# Patient Record
Sex: MALE | Race: WHITE | NOT HISPANIC OR LATINO | Employment: OTHER | URBAN - METROPOLITAN AREA
[De-identification: names, ages, dates, MRNs, and addresses within clinical notes are randomized per-mention and may not be internally consistent; named-entity substitution may affect disease eponyms.]

---

## 2020-09-14 ENCOUNTER — OFFICE VISIT (OUTPATIENT)
Dept: ORTHOPEDICS | Facility: CLINIC | Age: 75
End: 2020-09-14
Payer: MEDICARE

## 2020-09-14 VITALS
SYSTOLIC BLOOD PRESSURE: 160 MMHG | DIASTOLIC BLOOD PRESSURE: 78 MMHG | BODY MASS INDEX: 27.29 KG/M2 | HEIGHT: 72 IN | WEIGHT: 201.5 LBS | HEART RATE: 75 BPM

## 2020-09-14 DIAGNOSIS — M65.30 TRIGGER FINGER OF LEFT HAND, UNSPECIFIED FINGER: Primary | ICD-10-CM

## 2020-09-14 DIAGNOSIS — M65.30 TRIGGER FINGER OF RIGHT HAND, UNSPECIFIED FINGER: ICD-10-CM

## 2020-09-14 PROCEDURE — 99999 PR PBB SHADOW E&M-EST. PATIENT-LVL III: ICD-10-PCS | Mod: PBBFAC,,, | Performed by: ORTHOPAEDIC SURGERY

## 2020-09-14 PROCEDURE — 3008F BODY MASS INDEX DOCD: CPT | Mod: CPTII,S$GLB,, | Performed by: ORTHOPAEDIC SURGERY

## 2020-09-14 PROCEDURE — 20550 NJX 1 TENDON SHEATH/LIGAMENT: CPT | Mod: F2,F7,F8,S$GLB | Performed by: ORTHOPAEDIC SURGERY

## 2020-09-14 PROCEDURE — 99999 PR PBB SHADOW E&M-EST. PATIENT-LVL III: CPT | Mod: PBBFAC,,, | Performed by: ORTHOPAEDIC SURGERY

## 2020-09-14 PROCEDURE — 1159F MED LIST DOCD IN RCRD: CPT | Mod: S$GLB,,, | Performed by: ORTHOPAEDIC SURGERY

## 2020-09-14 PROCEDURE — 1101F PR PT FALLS ASSESS DOC 0-1 FALLS W/OUT INJ PAST YR: ICD-10-PCS | Mod: CPTII,S$GLB,, | Performed by: ORTHOPAEDIC SURGERY

## 2020-09-14 PROCEDURE — 1159F PR MEDICATION LIST DOCUMENTED IN MEDICAL RECORD: ICD-10-PCS | Mod: S$GLB,,, | Performed by: ORTHOPAEDIC SURGERY

## 2020-09-14 PROCEDURE — 99203 OFFICE O/P NEW LOW 30 MIN: CPT | Mod: 25,S$GLB,, | Performed by: ORTHOPAEDIC SURGERY

## 2020-09-14 PROCEDURE — 99203 PR OFFICE/OUTPT VISIT, NEW, LEVL III, 30-44 MIN: ICD-10-PCS | Mod: 25,S$GLB,, | Performed by: ORTHOPAEDIC SURGERY

## 2020-09-14 PROCEDURE — 3008F PR BODY MASS INDEX (BMI) DOCUMENTED: ICD-10-PCS | Mod: CPTII,S$GLB,, | Performed by: ORTHOPAEDIC SURGERY

## 2020-09-14 PROCEDURE — 1126F PR PAIN SEVERITY QUANTIFIED, NO PAIN PRESENT: ICD-10-PCS | Mod: S$GLB,,, | Performed by: ORTHOPAEDIC SURGERY

## 2020-09-14 PROCEDURE — 20550 TENDON SHEATH: ICD-10-PCS | Mod: F2,F7,F8,S$GLB | Performed by: ORTHOPAEDIC SURGERY

## 2020-09-14 PROCEDURE — 1126F AMNT PAIN NOTED NONE PRSNT: CPT | Mod: S$GLB,,, | Performed by: ORTHOPAEDIC SURGERY

## 2020-09-14 PROCEDURE — 1101F PT FALLS ASSESS-DOCD LE1/YR: CPT | Mod: CPTII,S$GLB,, | Performed by: ORTHOPAEDIC SURGERY

## 2020-09-14 RX ORDER — TRIAMCINOLONE ACETONIDE 40 MG/ML
40 INJECTION, SUSPENSION INTRA-ARTICULAR; INTRAMUSCULAR
Status: DISCONTINUED | OUTPATIENT
Start: 2020-09-14 | End: 2020-09-14 | Stop reason: HOSPADM

## 2020-09-14 RX ADMIN — TRIAMCINOLONE ACETONIDE 40 MG: 40 INJECTION, SUSPENSION INTRA-ARTICULAR; INTRAMUSCULAR at 01:09

## 2020-09-14 NOTE — PROGRESS NOTES
2020    Chief Complaint:  Chief Complaint   Patient presents with    Hand Problem     NP, c/o bilateral ring and middle finger trigger       HPI:  John Hernández is a 75 y.o. male, who presents to clinic today has a history of bilateral middle and ring finger triggering.  He states that this has been going on for months.  He states that the right is worse than the left.  He states that he did not have any injury prior to the onset.  He states that there gradually getting worse.  He states that he has not had any treatment or evaluation.  He has no other complaints period    PMHX:  Past Medical History:   Diagnosis Date    Borderline diabetic     Elevated prostate specific antigen (PSA)     Hypotension        PSHX:  Past Surgical History:   Procedure Laterality Date    OTHER SURGICAL HISTORY  2017    tooth extraction       FMHX:  Family History   Problem Relation Age of Onset    Glaucoma Father     Cancer Mother         ovarian       SOCHX:  Social History     Tobacco Use    Smoking status: Former Smoker     Quit date: 1984     Years since quittin.7    Smokeless tobacco: Never Used   Substance Use Topics    Alcohol use: Yes     Comment: beer-occasionally       ALLERGIES:  [place] and Unable to assess    CURRENT MEDICATIONS:  No current outpatient medications on file prior to visit.     No current facility-administered medications on file prior to visit.        REVIEW OF SYSTEMS:  Review of Systems   Constitutional: Negative.    HENT: Positive for tinnitus. Negative for congestion, ear discharge, ear pain, hearing loss, nosebleeds, sinus pain and sore throat.    Eyes: Negative.    Respiratory: Negative.  Negative for stridor.    Cardiovascular: Negative.    Gastrointestinal: Negative.    Genitourinary: Negative.    Musculoskeletal: Negative.    Skin: Negative.    Neurological: Negative.    Endo/Heme/Allergies: Negative for environmental allergies and polydipsia. Bruises/bleeds easily.    Psychiatric/Behavioral: Negative.        GENERAL PHYSICAL EXAM:   Ht 6' (1.829 m)   Wt 91.4 kg (201 lb 8 oz)   BMI 27.33 kg/m²    GEN: well developed, well nourished, no acute distress   HENT: Normocephalic, atraumatic   EYES: No discharge, conjunctiva normal   NECK: Supple, non-tender   PULM: No wheezing, no respiratory distress   CV: RRR   ABD: Soft, non-tender    ORTHO EXAM:   Examination of bilateral hands and wrist reveals that there is no edema.  There are no skin changes.  Palpation produces mild tenderness over the A1 pulley of bilateral middle and ring fingers.  There are no other areas of tenderness.  Flexion and extension of the fingers reveals that there is active triggering of bilateral middle fingers and mild triggering of the right ring finger.  The left ring finger has no significant triggering noted.  He does report grossly intact sensation in the median radial and ulnar distributions bilaterally.  Capillary refill is less than 2 sec in all the digits    RADIOLOGY:   None    ASSESSMENT:   Right middle finger trigger, right ring finger trigger, left middle finger trigger    PLAN:  1.  After informed consent was obtained and under sterile conditions injection was placed to the right middle finger flexor tendon sheath, the right ring finger flexor tendon sheath, and the left middle finger flexor tendon sheath.  The patient tolerated all 3 injections well    2.  Patient will follow up with me on a p.r.n. basis

## 2020-09-14 NOTE — PROCEDURES
Tendon Sheath    Date/Time: 9/14/2020 1:20 PM  Performed by: Stiven Zamora MD  Authorized by: Stiven Zamora MD     Consent Done?:  Yes (Verbal)  Indications:  Pain  Site marked: the procedure site was marked    Timeout: prior to procedure the correct patient, procedure, and site was verified    Prep: patient was prepped and draped in usual sterile fashion      Local anesthetic:  Lidocaine 1% without epinephrine  Anesthetic total (ml):  0.5    Location:  Ring finger  Ring finger joint: Right ring finger flexor tendon sheath.  Needle size:  25 G  Medications:  40 mg triamcinolone acetonide 40 mg/mL (20mg injected)  Patient tolerance:  Patient tolerated the procedure well with no immediate complications

## 2020-09-14 NOTE — PROCEDURES
Tendon Sheath    Date/Time: 9/14/2020 1:20 PM  Performed by: Stiven Zamora MD  Authorized by: Stiven Zamora MD     Consent Done?:  Yes (Verbal)  Indications:  Pain  Site marked: the procedure site was marked    Timeout: prior to procedure the correct patient, procedure, and site was verified    Prep: patient was prepped and draped in usual sterile fashion      Local anesthetic:  Lidocaine 1% without epinephrine  Anesthetic total (ml):  0.5    Location:  Long finger  Long finger joint: Left middle finger flexor tendon sheath.  Needle size:  25 G  Medications:  40 mg triamcinolone acetonide 40 mg/mL (20mg injected)  Patient tolerance:  Patient tolerated the procedure well with no immediate complications

## 2020-09-14 NOTE — PROCEDURES
Tendon Sheath    Date/Time: 9/14/2020 1:20 PM  Performed by: Stiven Zamora MD  Authorized by: Stiven Zamora MD     Consent Done?:  Yes (Verbal)  Indications:  Pain  Site marked: the procedure site was marked    Timeout: prior to procedure the correct patient, procedure, and site was verified    Prep: patient was prepped and draped in usual sterile fashion      Local anesthetic:  Lidocaine 1% without epinephrine  Anesthetic total (ml):  0.5    Location:  Long finger  Long finger joint: Right middle finger flexor tendon sheath.  Needle size:  25 G  Medications:  40 mg triamcinolone acetonide 40 mg/mL (20mg injected)  Patient tolerance:  Patient tolerated the procedure well with no immediate complications

## 2021-01-09 ENCOUNTER — IMMUNIZATION (OUTPATIENT)
Dept: FAMILY MEDICINE | Facility: CLINIC | Age: 76
End: 2021-01-09
Payer: MEDICARE

## 2021-01-09 DIAGNOSIS — Z23 NEED FOR VACCINATION: ICD-10-CM

## 2021-01-09 PROCEDURE — 91300 COVID-19, MRNA, LNP-S, PF, 30 MCG/0.3 ML DOSE VACCINE: CPT | Mod: PBBFAC | Performed by: INTERNAL MEDICINE

## 2021-01-30 ENCOUNTER — IMMUNIZATION (OUTPATIENT)
Dept: FAMILY MEDICINE | Facility: CLINIC | Age: 76
End: 2021-01-30
Payer: MEDICARE

## 2021-01-30 DIAGNOSIS — Z23 NEED FOR VACCINATION: Primary | ICD-10-CM

## 2021-01-30 PROCEDURE — 0002A COVID-19, MRNA, LNP-S, PF, 30 MCG/0.3 ML DOSE VACCINE: CPT | Mod: PBBFAC | Performed by: INTERNAL MEDICINE

## 2021-01-30 PROCEDURE — 91300 COVID-19, MRNA, LNP-S, PF, 30 MCG/0.3 ML DOSE VACCINE: CPT | Mod: PBBFAC | Performed by: INTERNAL MEDICINE

## 2023-03-31 PROBLEM — N17.9 AKI (ACUTE KIDNEY INJURY): Status: ACTIVE | Noted: 2023-01-01

## 2023-03-31 PROBLEM — Z71.89 ACP (ADVANCE CARE PLANNING): Status: ACTIVE | Noted: 2023-01-01

## 2023-03-31 PROBLEM — R11.2 INTRACTABLE NAUSEA AND VOMITING: Status: ACTIVE | Noted: 2023-01-01

## 2023-03-31 PROBLEM — R19.7 DIARRHEA: Status: ACTIVE | Noted: 2023-01-01

## 2023-03-31 PROBLEM — N39.0 UTI (URINARY TRACT INFECTION): Status: ACTIVE | Noted: 2023-01-01

## 2023-04-02 PROBLEM — D61.818 PANCYTOPENIA: Status: ACTIVE | Noted: 2023-01-01

## 2023-04-03 PROBLEM — R63.4 WEIGHT LOSS: Status: ACTIVE | Noted: 2023-01-01

## 2023-04-05 PROBLEM — T78.3XXA ANGIOEDEMA: Status: ACTIVE | Noted: 2023-01-01

## 2023-04-05 PROBLEM — R50.9 FEVER: Status: ACTIVE | Noted: 2023-01-01

## 2023-04-05 PROBLEM — R22.1 NECK SWELLING: Status: ACTIVE | Noted: 2023-01-01

## 2023-04-06 PROBLEM — K25.9 GASTRIC ULCER: Status: ACTIVE | Noted: 2023-01-01

## 2023-04-12 PROBLEM — R19.7 DIARRHEA: Status: RESOLVED | Noted: 2023-01-01 | Resolved: 2023-01-01

## 2023-04-12 PROBLEM — K25.9 GASTRIC ULCER: Status: RESOLVED | Noted: 2023-01-01 | Resolved: 2023-01-01

## 2023-04-12 PROBLEM — R65.21 SEPTIC SHOCK: Status: ACTIVE | Noted: 2023-01-01

## 2023-04-12 PROBLEM — R63.4 WEIGHT LOSS: Status: RESOLVED | Noted: 2023-01-01 | Resolved: 2023-01-01

## 2023-04-12 PROBLEM — T78.3XXA ANGIOEDEMA: Status: RESOLVED | Noted: 2023-01-01 | Resolved: 2023-01-01

## 2023-04-12 PROBLEM — A41.9 SEVERE SEPSIS: Status: ACTIVE | Noted: 2023-01-01

## 2023-04-12 PROBLEM — R50.9 FEVER: Status: RESOLVED | Noted: 2023-01-01 | Resolved: 2023-01-01

## 2023-04-12 PROBLEM — R65.20 SEVERE SEPSIS: Status: ACTIVE | Noted: 2023-01-01

## 2023-04-12 PROBLEM — M65.30 TRIGGER FINGER OF LEFT HAND: Status: RESOLVED | Noted: 2020-09-14 | Resolved: 2023-01-01

## 2023-04-12 PROBLEM — M65.30 TRIGGER FINGER OF RIGHT HAND: Status: RESOLVED | Noted: 2020-09-14 | Resolved: 2023-01-01

## 2023-04-12 PROBLEM — R11.2 INTRACTABLE NAUSEA AND VOMITING: Status: RESOLVED | Noted: 2023-01-01 | Resolved: 2023-01-01

## 2023-04-12 PROBLEM — N39.0 UTI (URINARY TRACT INFECTION): Status: RESOLVED | Noted: 2023-01-01 | Resolved: 2023-01-01

## 2023-04-12 PROBLEM — R22.1 NECK SWELLING: Status: RESOLVED | Noted: 2023-01-01 | Resolved: 2023-01-01

## 2023-04-14 PROBLEM — E03.9 HYPOTHYROIDISM: Status: ACTIVE | Noted: 2023-01-01

## 2023-04-14 PROBLEM — E05.90 HYPERTHYROIDISM: Status: ACTIVE | Noted: 2023-01-01

## 2023-04-16 PROBLEM — R53.81 DEBILITY: Status: ACTIVE | Noted: 2023-01-01

## 2023-04-17 PROBLEM — I37.1 PULMONARY REGURGITATION: Status: ACTIVE | Noted: 2023-01-01

## 2023-04-18 PROBLEM — B40.9 BLASTOMYCOSIS: Status: ACTIVE | Noted: 2023-01-01

## 2023-04-20 PROBLEM — R94.31 QT PROLONGATION: Status: ACTIVE | Noted: 2023-01-01

## 2023-04-24 PROBLEM — R97.20 ELEVATED PSA: Status: ACTIVE | Noted: 2023-01-01

## 2023-04-24 PROBLEM — D64.9 ANEMIA: Status: ACTIVE | Noted: 2023-01-01

## 2023-04-24 PROBLEM — R40.0 SOMNOLENCE: Status: ACTIVE | Noted: 2023-01-01

## 2023-04-24 PROBLEM — D61.818 PANCYTOPENIA: Status: ACTIVE | Noted: 2023-01-01

## 2023-04-24 PROBLEM — R91.8 GROUND GLASS OPACITY PRESENT ON IMAGING OF LUNG: Status: ACTIVE | Noted: 2023-01-01

## 2023-04-24 PROBLEM — R93.5 ABNORMAL CT OF THE ABDOMEN: Status: ACTIVE | Noted: 2023-01-01
